# Patient Record
Sex: MALE | Race: WHITE | NOT HISPANIC OR LATINO | Employment: FULL TIME | URBAN - METROPOLITAN AREA
[De-identification: names, ages, dates, MRNs, and addresses within clinical notes are randomized per-mention and may not be internally consistent; named-entity substitution may affect disease eponyms.]

---

## 2023-11-22 PROBLEM — H92.21 EAR BLEEDING, RIGHT: Status: ACTIVE | Noted: 2023-11-22

## 2023-11-22 PROBLEM — J34.2 NASAL SEPTAL DEVIATION: Status: ACTIVE | Noted: 2023-11-22

## 2023-11-22 PROBLEM — H61.23 BILATERAL IMPACTED CERUMEN: Status: ACTIVE | Noted: 2023-11-22

## 2023-11-22 PROBLEM — H93.293 ABNORMAL AUDITORY PERCEPTION OF BOTH EARS: Status: ACTIVE | Noted: 2023-11-22

## 2023-11-22 PROBLEM — J31.0 RHINITIS MEDICAMENTOSA: Status: ACTIVE | Noted: 2023-11-22

## 2023-11-22 PROBLEM — G47.33 SLEEP APNEA, OBSTRUCTIVE: Status: ACTIVE | Noted: 2023-11-22

## 2023-11-22 PROBLEM — J34.89 NASAL DRYNESS: Status: ACTIVE | Noted: 2023-11-22

## 2023-11-22 PROBLEM — R04.0 EPISTAXIS, RECURRENT: Status: ACTIVE | Noted: 2023-11-22

## 2023-11-22 PROBLEM — T48.5X5A RHINITIS MEDICAMENTOSA: Status: ACTIVE | Noted: 2023-11-22

## 2023-11-22 PROBLEM — J34.3 NASAL TURBINATE HYPERTROPHY: Status: ACTIVE | Noted: 2023-11-22

## 2023-11-22 RX ORDER — NEBIVOLOL 10 MG/1
TABLET ORAL
COMMUNITY
Start: 2015-11-09

## 2023-11-22 RX ORDER — HYDROCHLOROTHIAZIDE 25 MG/1
TABLET ORAL
COMMUNITY
Start: 2016-12-01

## 2023-11-22 RX ORDER — ASPIRIN 81 MG/1
TABLET ORAL
COMMUNITY
Start: 2016-08-04

## 2023-11-22 RX ORDER — OMEPRAZOLE 40 MG/1
CAPSULE, DELAYED RELEASE ORAL
COMMUNITY
Start: 2015-11-09

## 2023-11-28 NOTE — PROGRESS NOTES
Subjective   Patient ID: Shahab Quiñones is a 58 y.o. male who presents for No chief complaint on file..  HPI  This 58-year-old male is being seen back in the office for 3-month recheck of his ears and head and neck.  He has a tendency for ceruminous buildup which complicates his hearing function.  Once his canals are patent he has no complaints regarding hearing.  He has on past visits chose not to have follow-up audiograms.  He did have a verrucous lesion removed from his right ear in the past.  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  There is been no new head and neck issues noted at this time.  He has had problems in the past with turbinate hypertrophy and at one point was using decongestant nasal sprays causing rhinitis medicamentosa's.   He has been encouraged to use saline nasal rinses and topical steroid sprays.  He at times does use some tobacco which can be detrimental to his upper respiratory tract and has been advised to restrain from that.  Review of Systems    Objective   Physical Exam  EXAMINATION:    GENERAL JONATHAN.EARANCE: Alert, in no acute distress, normal pitch and clarity of voice, well-developed and nourished, cooperative.    HEAD/FACE: Normocephalic, atraumatic, normal facial movements and strength, no no tenderness to palpation, no lesions noted.    SKIN: Normal turgor, no raised or ulcerative lesions, warm and dry to palpation.    EYES: Extraocular motions intact, no nystagmus noted, pupils equal and reactive to light and accommodation, no conjunctivitis.    EARS: Both ears--external ear anatomy is normal without lesions, cerumen partially obstructs the ear canals and is removed, auditory canals are patent and without skin abrasions or lesions, hearing is intact to voice, tympanic membranes are intact with no acute inflammation, light reflexes present, no effusions are noted and no mastoid tenderness found to palpation.    NOSE: No external  skin lesions are noted, nares are patent, septum is anteriorly deviated to the left and then inferiorly deviated to the right, there is mild t nasal dryness noted on the septal mucosa and no intranasal signs of purulence is seen     OROPHARYNX/ORAL CAVITY: Oropharynx is not inflamed and is without lesions, mucosa of the oral cavity is intact and without lesions, tongue is midline and mobile, no acute dental disease is noted, TMJs are mobile    LUNG-- NO wheezing or rhonchi normal respiratory effort    HEART-- No venous congestion,  rate and rhythm regular,    NECK: No lymphadenopathy is palpated, carotid pulses are intact, neck is supple with full range of motion, no thyroid abnormalities are noted, trachea is midline, no neck masses are palpated.    LYMPHATICS: No cervical adenopathy or supraclavicular adenopathy is palpated.    NEUROLOGIC/PSYCH; alert and oriented, cranial nerves are grossly intact, gait is without falling, no motor deficits are noted.    Patient ID: Shahab Quiñones is a 58 y.o. male.    Ear cerumen removal    Date/Time: 11/30/2023 8:22 AM    Performed by: Reji Lake DMD, MD  Authorized by: Reji Lake DMD, MD    Consent:     Consent obtained:  Verbal    Consent given by:  Patient    Risks, benefits, and alternatives were discussed: yes      Risks discussed:  Incomplete removal, pain, infection, dizziness and bleeding    Alternatives discussed:  Observation  Post-procedure details:     Procedure completion:  Tolerated well, no immediate complications  Comments:      CERUMEN REMOVAL    Consent:   the planned procedure is discussed including possible risks, benefits and alternative treatments reviewed. Verbal consent is obtained.    Indications:  obstructive cerumen is noted affecting hearing and causing discomfort.    Procedure: The ears are examined microscopically and obstructive cerumen is removed with a wax loop, suction, and forceps.    Findings: Cerumen and epithelial debris  obstructs both canals. Tympanic membranes are intact and noninflamed once visualized and no infections are noted.    Post procedure: The patient tolerated the procedure well without complications.    Assessment/Plan   Problem List Items Addressed This Visit             ICD-10-CM    Bilateral impacted cerumen - Primary H61.23    Nasal dryness J34.89    Nasal septal deviation J34.2    Nasal turbinate hypertrophy J34.3     The degree of cerumen buildup was not enough to compromise hearing but does seem to accrue and he feels comfortable staying on a 3-month schedule for debridement.  I did encourage him at some point to have a baseline hearing test done for further reference.  He does periodically have streaks of blood in nasal mucus likely from dryness and I did encourage him to use nasal saline washes along with saline gels especially during the winter season to counteract that problem.  If he has any chelsey nosebleeds he should contact the office and be seen.  This would also be important if he had any facial pain or headache or symptoms to suggest sinus involvement.

## 2023-11-30 ENCOUNTER — OFFICE VISIT (OUTPATIENT)
Dept: OTOLARYNGOLOGY | Facility: CLINIC | Age: 58
End: 2023-11-30
Payer: COMMERCIAL

## 2023-11-30 VITALS — WEIGHT: 240 LBS | HEIGHT: 72 IN | BODY MASS INDEX: 32.51 KG/M2

## 2023-11-30 DIAGNOSIS — J34.3 NASAL TURBINATE HYPERTROPHY: ICD-10-CM

## 2023-11-30 DIAGNOSIS — J34.89 NASAL DRYNESS: ICD-10-CM

## 2023-11-30 DIAGNOSIS — H61.23 BILATERAL IMPACTED CERUMEN: Primary | ICD-10-CM

## 2023-11-30 DIAGNOSIS — J34.2 NASAL SEPTAL DEVIATION: ICD-10-CM

## 2023-11-30 PROBLEM — E11.9 TYPE 2 DIABETES MELLITUS (MULTI): Status: ACTIVE | Noted: 2018-05-23

## 2023-11-30 PROBLEM — E78.5 HYPERLIPIDEMIA: Status: ACTIVE | Noted: 2018-05-23

## 2023-11-30 PROBLEM — E66.9 OBESITY WITH BODY MASS INDEX 30 OR GREATER: Status: ACTIVE | Noted: 2018-05-23

## 2023-11-30 PROBLEM — I65.29 CAROTID ARTERY STENOSIS: Status: ACTIVE | Noted: 2018-05-23

## 2023-11-30 PROBLEM — I25.10 ATHEROSCLEROSIS OF CORONARY ARTERY: Status: ACTIVE | Noted: 2018-05-23

## 2023-11-30 PROBLEM — F17.220 CHEWING TOBACCO NICOTINE DEPENDENCE: Status: ACTIVE | Noted: 2023-11-30

## 2023-11-30 PROBLEM — I10 ESSENTIAL HYPERTENSION: Status: ACTIVE | Noted: 2018-05-23

## 2023-11-30 PROBLEM — Z95.5 H/O HEART ARTERY STENT: Status: ACTIVE | Noted: 2023-11-30

## 2023-11-30 PROCEDURE — 99213 OFFICE O/P EST LOW 20 MIN: CPT | Performed by: OTOLARYNGOLOGY

## 2023-11-30 PROCEDURE — 69210 REMOVE IMPACTED EAR WAX UNI: CPT | Performed by: OTOLARYNGOLOGY

## 2023-11-30 RX ORDER — ROSUVASTATIN CALCIUM 20 MG/1
TABLET, COATED ORAL
COMMUNITY
Start: 2023-09-25

## 2023-11-30 RX ORDER — ATORVASTATIN CALCIUM 80 MG/1
TABLET, FILM COATED ORAL
COMMUNITY
Start: 2020-02-11 | End: 2023-11-30 | Stop reason: ALTCHOICE

## 2023-11-30 RX ORDER — INSULIN LISPRO 100 [IU]/ML
INJECTION, SOLUTION INTRAVENOUS; SUBCUTANEOUS
COMMUNITY
Start: 2023-09-25

## 2023-11-30 RX ORDER — INSULIN GLARGINE 300 U/ML
INJECTION, SOLUTION SUBCUTANEOUS
COMMUNITY
Start: 2023-03-13

## 2024-02-25 NOTE — PROGRESS NOTES
Subjective   Patient ID: Shahab Quiñones is a 58 y.o. male who presents for No chief complaint on file..  HPI  This 58-year-old male is being seen back in the office for 3-month recheck of his ears and head and neck. He has a tendency for ceruminous buildup which complicates his hearing function. Once his canals are patent he has no complaints regarding hearing. He has on past visits chose not to have follow-up audiograms. He did have a verrucous lesion removed from his right ear in the past. 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 There is been no new head and neck issues noted at this time.  He has had some difficulties in the past with nasal congestion primarily associated with nasal turbinate hypertrophy.    Review of Systems  A 12 point ROS has been reviewed and are negative for complaint except what is stated in the history of present illness and/or past medical history as noted in the EMR    Objective   Physical Exam  EXAMINATION:     GENERAL JONATHAN.EARANCE: Alert, in no acute distress, normal pitch and clarity of voice, well-developed and nourished, cooperative.     HEAD/FACE: Normocephalic, atraumatic, normal facial movements and strength, no no tenderness to palpation, no lesions noted.     SKIN: Normal turgor, no raised or ulcerative lesions, warm and dry to palpation.     EYES: Extraocular motions intact, no nystagmus noted, pupils equal and reactive to light and accommodation, no conjunctivitis.     EARS: Both ears--external ear anatomy is normal without lesions, cerumen partially obstructs the ear canals and is removed, auditory canals are patent and without skin abrasions or lesions, hearing is intact to voice, tympanic membranes are intact with no acute inflammation, light reflexes present, no effusions are noted and no mastoid tenderness found to palpation.     NOSE: No external skin lesions are noted, nares are patent, septum is anteriorly deviated  to the left and then inferiorly deviated to the right, there is mild t nasal dryness noted on the septal mucosa and no intranasal signs of purulence is seen      OROPHARYNX/ORAL CAVITY: Oropharynx is not inflamed and is without lesions, mucosa of the oral cavity is intact and without lesions, tongue is midline and mobile, no acute dental disease is noted, TMJs are mobile     LUNG-- NO wheezing or rhonchi normal respiratory effort     HEART-- No venous congestion,  rate and rhythm regular,     NECK: No lymphadenopathy is palpated, carotid pulses are intact, neck is supple with full range of motion, no thyroid abnormalities are noted, trachea is midline, no neck masses are palpated.     LYMPHATICS: No cervical adenopathy or supraclavicular adenopathy is palpated.     NEUROLOGIC/PSYCH; alert and oriented, cranial nerves are grossly intact, gait is without falling, no motor deficits are noted.    Patient ID: Shahab Quiñones is a 58 y.o. male.    Ear cerumen removal    Date/Time: 2/29/2024 8:10 AM    Performed by: Reji Lake DMD, MD  Authorized by: Reji Lake DMD, MD    Consent:     Consent obtained:  Verbal    Consent given by:  Patient    Risks discussed:  Pain and incomplete removal    Alternatives discussed:  No treatment and alternative treatment  Universal protocol:     Procedure explained and questions answered to patient or proxy's satisfaction: yes      Imaging studies available: no      Required blood products, implants, devices, and special equipment available: no      Patient identity confirmed:  Verbally with patient  Procedure details:     Location:  L ear and R ear    Procedure type: curette      Procedure type comment:  Or suction    Procedure outcomes: cerumen removed    Post-procedure details:     Inspection:  No bleeding and ear canal clear    Hearing quality:  Improved    Procedure completion:  Tolerated well, no immediate complications  Comments:      CERUMEN REMOVAL    Consent:   the  planned procedure is discussed including possible risks, benefits and alternative treatments reviewed. Verbal consent is obtained.    Indications:  obstructive cerumen is noted affecting hearing and causing discomfort.    Procedure: The ears are examined microscopically and obstructive cerumen is removed with a wax loop, suction, and forceps.    Findings: Cerumen and epithelial debris obstructs both canals. Tympanic membranes are intact and noninflamed once visualized and no infections are noted.    Post procedure: The patient tolerated the procedure well without complications.    Assessment/Plan   Problem List Items Addressed This Visit             ICD-10-CM    Abnormal auditory perception of both ears - Primary H93.293    Bilateral impacted cerumen H61.23    Nasal septal deviation J34.2    Nasal turbinate hypertrophy J34.3    Chewing tobacco nicotine dependence F17.220     I discussed the clinical finds with patient.  His exam today did show evidence for some cerumen around the outer third of the canal which was removed but no deep impaction was noted.  The tympanic membranes were intact and were negative for signs of inflammation.  There was no evidence for nasal crusting although he has had problems with dryness and surface bleeding in the past.  He should continue with the saline gels either spray or ointment form and if he does have crusting around the nasal vestibular area causing inflammation then bacitracin ointment twice a day for a week would be helpful.  We reviewed again the ways to try and counteract barotrauma during flying which has been a problem for him in the past.  The audiogram that was done in 2023 was reviewed with him making sure he was aware that he does have a moderate to moderately severe high-frequency hearing loss and protection of his years from loud sound is important to continue.   He does intermittently use chewing tobacco and he is aware of the potential ramifications of that.  I  encouraged him to avoid tobacco use overall.    Reji Lake DMD, MD 02/25/24 11:07 AM

## 2024-02-29 ENCOUNTER — OFFICE VISIT (OUTPATIENT)
Dept: OTOLARYNGOLOGY | Facility: CLINIC | Age: 59
End: 2024-02-29
Payer: COMMERCIAL

## 2024-02-29 VITALS — HEIGHT: 72 IN | WEIGHT: 240 LBS | BODY MASS INDEX: 32.51 KG/M2

## 2024-02-29 DIAGNOSIS — J34.3 NASAL TURBINATE HYPERTROPHY: ICD-10-CM

## 2024-02-29 DIAGNOSIS — H93.293 ABNORMAL AUDITORY PERCEPTION OF BOTH EARS: Primary | ICD-10-CM

## 2024-02-29 DIAGNOSIS — H61.23 BILATERAL IMPACTED CERUMEN: ICD-10-CM

## 2024-02-29 DIAGNOSIS — F17.220 CHEWING TOBACCO NICOTINE DEPENDENCE WITHOUT COMPLICATION: ICD-10-CM

## 2024-02-29 DIAGNOSIS — J34.2 NASAL SEPTAL DEVIATION: ICD-10-CM

## 2024-02-29 PROCEDURE — 1036F TOBACCO NON-USER: CPT | Performed by: OTOLARYNGOLOGY

## 2024-02-29 PROCEDURE — 69210 REMOVE IMPACTED EAR WAX UNI: CPT | Performed by: OTOLARYNGOLOGY

## 2024-02-29 PROCEDURE — 99213 OFFICE O/P EST LOW 20 MIN: CPT | Performed by: OTOLARYNGOLOGY

## 2024-02-29 RX ORDER — TIZANIDINE 4 MG/1
TABLET ORAL
COMMUNITY
Start: 2024-02-13 | End: 2024-02-29 | Stop reason: ALTCHOICE

## 2024-02-29 RX ORDER — LEVOTHYROXINE SODIUM 75 UG/1
75 TABLET ORAL DAILY
COMMUNITY
Start: 2024-01-09

## 2024-05-20 NOTE — PROGRESS NOTES
Subjective   Patient ID: Shahab Quiñones is a 58 y.o. male who presents for No chief complaint on file..  HPI  This 58-year-old male is being seen back in the office for scheduled recheck.  He has had difficulties with obstruction from ceruminous debris requiring debridement.  He has deferred follow-up audiograms but has no complaints in regards to functional hearing.  He has had in the past some difficulties with barotrauma during flights and at times has difficulties with nasal congestion prompted by turbinate hypertrophy.  He does have a history of insulin-dependent diabetes, sleep apnea, coronary artery disease, essential hypertension.  He is having no difficulties with his hearing and has had no problems with respiratory infections or eustachian tube function.  He continues to use oral tobacco.  Review of Systems  A 12 point ROS has been reviewed and are negative for complaint except what is stated in the history of present illness and/or past medical history as noted in the EMR  Active Ambulatory Problems     Diagnosis Date Noted    Abnormal auditory perception of both ears 11/22/2023    Bilateral impacted cerumen 11/22/2023    Ear bleeding, right 11/22/2023    Epistaxis, recurrent 11/22/2023    Nasal dryness 11/22/2023    Nasal septal deviation 11/22/2023    Nasal turbinate hypertrophy 11/22/2023    Rhinitis medicamentosa 11/22/2023    Sleep apnea, obstructive 11/22/2023    Atherosclerosis of coronary artery 05/23/2018    Carotid artery stenosis 05/23/2018    Chewing tobacco nicotine dependence 11/30/2023    Essential hypertension 05/23/2018    H/O heart artery stent 11/30/2023    Hyperlipidemia 05/23/2018    Obesity with body mass index 30 or greater 05/23/2018    Type 2 diabetes mellitus (Multi) 05/23/2018     Resolved Ambulatory Problems     Diagnosis Date Noted    No Resolved Ambulatory Problems     Past Medical History:   Diagnosis Date    Disease of stomach and duodenum, unspecified     Personal history  of other diseases of the circulatory system     Personal history of other drug therapy     Personal history of other endocrine, nutritional and metabolic disease     Pure hypercholesterolemia, unspecified            Current Outpatient Medications:     aspirin 81 mg EC tablet, Take by mouth., Disp: , Rfl:     HumaLOG KwikPen Insulin 100 unit/mL injection, , Disp: , Rfl:     hydroCHLOROthiazide (HYDRODiuril) 25 mg tablet, Take by mouth., Disp: , Rfl:     levothyroxine (Synthroid, Levoxyl) 75 mcg tablet, Take 1 tablet (75 mcg) by mouth once daily., Disp: , Rfl:     nebivolol (Bystolic) 10 mg tablet, Take by mouth., Disp: , Rfl:     omeprazole (PriLOSEC) 40 mg DR capsule, Take by mouth., Disp: , Rfl:     rosuvastatin (Crestor) 20 mg tablet, , Disp: , Rfl:     ticagrelor (Brilinta) 60 mg tablet, Take by mouth., Disp: , Rfl:     Toujeo Max U-300 SoloStar 300 unit/mL (3 mL) injection, , Disp: , Rfl:      Social History     Tobacco Use    Smoking status: Former     Current packs/day: 1.00     Average packs/day: 1 pack/day for 10.0 years (10.0 ttl pk-yrs)     Types: Cigarettes    Smokeless tobacco: Never   Substance Use Topics    Alcohol use: Yes        Allergies   Allergen Reactions    Ace Inhibitors Other    Angiotensin Ii,Human Other    Arb-Angiotensin Receptor Antagonist Other        There were no vitals taken for this visit.   Objective   Physical Exam  EXAMINATION:     GENERAL JONATHAN.EARANCE: Alert, in no acute distress, normal pitch and clarity of voice, well-developed and nourished, cooperative.     HEAD/FACE: Normocephalic, atraumatic, normal facial movements and strength, no no tenderness to palpation, no lesions noted.     SKIN: Normal turgor, no raised or ulcerative lesions, warm and dry to palpation.     EYES: Extraocular motions intact, no nystagmus noted, pupils equal and reactive to light and accommodation, no conjunctivitis.     EARS: Both ears--external ear anatomy is normal without lesions, cerumen partially  obstructs the ear canals and is removed, auditory canals are patent and without skin abrasions or lesions, hearing is intact to voice, tympanic membranes are intact with no acute inflammation, light reflexes present, no effusions are noted and no mastoid tenderness found to palpation.     NOSE: No external skin lesions are noted, nares are patent, septum is anteriorly deviated to the left and then inferiorly deviated to the right, there is mild t nasal dryness noted on the septal mucosa and no intranasal signs of purulence is seen      OROPHARYNX/ORAL CAVITY: Oropharynx is not inflamed and is without lesions, mucosa of the oral cavity is intact and without lesions, tongue is midline and mobile, no acute dental disease is noted, TMJs are mobile, there did not appear to be any leukoplakia or erythroplasia in the labial or buccal sulcus from his tobacco use.     LUNG-- NO wheezing or rhonchi normal respiratory effort     HEART-- No venous congestion,  rate and rhythm regular,     NECK: No lymphadenopathy is palpated, carotid pulses are intact, neck is supple with full range of motion, no thyroid abnormalities are noted, trachea is midline, no neck masses are palpated.     LYMPHATICS: No cervical adenopathy or supraclavicular adenopathy is palpated.     NEUROLOGIC/PSYCH; alert and oriented, cranial nerves are grossly intact, gait is without falling, no motor deficits are noted.    Patient ID: Shahab Quiñones is a 58 y.o. male.    Ear cerumen removal    Date/Time: 5/23/2024 12:45 PM    Performed by: Reji Lake DMD, MD  Authorized by: Reji Lake DMD, MD    Consent:     Consent obtained:  Verbal    Consent given by:  Patient    Risks discussed:  Pain and incomplete removal    Alternatives discussed:  No treatment and alternative treatment  Universal protocol:     Procedure explained and questions answered to patient or proxy's satisfaction: yes      Imaging studies available: no      Required blood products,  implants, devices, and special equipment available: no      Patient identity confirmed:  Verbally with patient  Procedure details:     Location:  L ear and R ear    Procedure type: curette      Procedure type comment:  Or suction    Procedure outcomes: cerumen removed    Post-procedure details:     Inspection:  No bleeding, ear canal clear and TM intact    Hearing quality:  Improved    Procedure completion:  Tolerated well, no immediate complications       Assessment/Plan   Problem List Items Addressed This Visit             ICD-10-CM    Bilateral impacted cerumen - Primary H61.23    Nasal dryness J34.89    Nasal septal deviation J34.2    Nasal turbinate hypertrophy J34.3    Sleep apnea, obstructive G47.33    Chewing tobacco nicotine dependence F17.220     I discussed the clinical finds with the patient.  In review of his findings there did not appear to be any acute abnormalities in the head neck.  No inflammation is noted involving the ear canal or middle ear.  He has no risk currents of the papillomatous lesion removed from his right outer ear.  No active signs of infection in the upper respiratory tract are noted.  He seems to be doing well with swallowing and with voice.  He will stay in a 3-month checkup schedule unless there are symptoms that develop in regards to discomfort or signs of infection.  His last hearing test was done in 2023 and he does have a high-frequency hearing loss which he compensates for.       Reji Lake DMD, MD 05/20/24 8:41 AM

## 2024-05-23 ENCOUNTER — OFFICE VISIT (OUTPATIENT)
Dept: OTOLARYNGOLOGY | Facility: CLINIC | Age: 59
End: 2024-05-23
Payer: COMMERCIAL

## 2024-05-23 VITALS — WEIGHT: 240 LBS | HEIGHT: 72 IN | BODY MASS INDEX: 32.51 KG/M2

## 2024-05-23 DIAGNOSIS — J34.3 NASAL TURBINATE HYPERTROPHY: ICD-10-CM

## 2024-05-23 DIAGNOSIS — J34.2 NASAL SEPTAL DEVIATION: ICD-10-CM

## 2024-05-23 DIAGNOSIS — F17.220 CHEWING TOBACCO NICOTINE DEPENDENCE WITHOUT COMPLICATION: ICD-10-CM

## 2024-05-23 DIAGNOSIS — G47.33 SLEEP APNEA, OBSTRUCTIVE: ICD-10-CM

## 2024-05-23 DIAGNOSIS — H61.23 BILATERAL IMPACTED CERUMEN: Primary | ICD-10-CM

## 2024-05-23 DIAGNOSIS — J34.89 NASAL DRYNESS: ICD-10-CM

## 2024-05-23 PROCEDURE — 99213 OFFICE O/P EST LOW 20 MIN: CPT | Performed by: OTOLARYNGOLOGY

## 2024-05-23 PROCEDURE — 1036F TOBACCO NON-USER: CPT | Performed by: OTOLARYNGOLOGY

## 2024-05-23 PROCEDURE — 69210 REMOVE IMPACTED EAR WAX UNI: CPT | Performed by: OTOLARYNGOLOGY

## 2024-05-23 RX ORDER — TIZANIDINE 4 MG/1
4 TABLET ORAL EVERY 8 HOURS PRN
COMMUNITY
Start: 2024-02-13

## 2024-05-23 RX ORDER — DIAZEPAM 5 MG/1
5 TABLET ORAL NIGHTLY PRN
COMMUNITY
Start: 2024-05-02

## 2024-05-23 RX ORDER — BEMPEDOIC ACID 180 MG/1
TABLET, FILM COATED ORAL
COMMUNITY
Start: 2024-05-16

## 2024-08-25 NOTE — PROGRESS NOTES
Subjective   Patient ID: Shahab Quiñones is a 58 y.o. male who presents for No chief complaint on file..  HPI  This 58-year-old male is being seen back in the office for scheduled recheck.  He has had difficulties with obstruction from ceruminous debris requiring debridement.  He has deferred follow-up audiograms but has no complaints in regards to functional hearing.  He has had in the past some difficulties with barotrauma during flights and at times has difficulties with nasal congestion prompted by turbinate hypertrophy.  He does have a history of insulin-dependent diabetes, sleep apnea, coronary artery disease, essential hypertension.   Review of Systems    Objective   Physical Exam  GENERAL JONATHAN.EARANCE: Alert, in no acute distress, normal pitch and clarity of voice, well-developed and nourished, cooperative.     HEAD/FACE: Normocephalic, atraumatic, normal facial movements and strength, no no tenderness to palpation, no lesions noted.     SKIN: Normal turgor, no raised or ulcerative lesions, warm and dry to palpation.     EYES: Extraocular motions intact, no nystagmus noted, pupils equal and reactive to light and accommodation, no conjunctivitis.     EARS: Both ears--external ear anatomy is normal without lesions, cerumen partially obstructs the ear canals and is removed, auditory canals are patent and without skin abrasions or lesions, hearing is intact to voice, tympanic membranes are intact with no acute inflammation, light reflexes present, no effusions are noted and no mastoid tenderness found to palpation.     NOSE: No external skin lesions are noted, nares are patent, septum is anteriorly deviated to the left and then inferiorly deviated to the right, there is mild t nasal dryness noted on the septal mucosa and no intranasal signs of purulence is seen      OROPHARYNX/ORAL CAVITY: Oropharynx is not inflamed and is without lesions, mucosa of the oral cavity is intact and without lesions, tongue is midline  and mobile, no acute dental disease is noted, TMJs are mobile, there did not appear to be any leukoplakia or erythroplasia in the labial or buccal sulcus from his tobacco use.     LUNG-- NO wheezing or rhonchi normal respiratory effort     HEART-- No venous congestion,  rate and rhythm regular,     NECK: No lymphadenopathy is palpated, carotid pulses are intact, neck is supple with full range of motion, no thyroid abnormalities are noted, trachea is midline, no neck masses are palpated.     LYMPHATICS: No cervical adenopathy or supraclavicular adenopathy is palpated.     NEUROLOGIC/PSYCH; alert and oriented, cranial nerves are grossly intact, gait is without falling, no motor deficits are noted.    Patient ID: Shahab Quiñones is a 58 y.o. male.    Ear cerumen removal    Date/Time: 8/29/2024 8:03 AM    Performed by: Reji Lake DMD, MD  Authorized by: Reji Lake DMD, MD    Consent:     Consent obtained:  Verbal    Consent given by:  Patient    Risks discussed:  Pain and incomplete removal    Alternatives discussed:  No treatment and alternative treatment  Universal protocol:     Procedure explained and questions answered to patient or proxy's satisfaction: yes      Imaging studies available: no      Required blood products, implants, devices, and special equipment available: no      Patient identity confirmed:  Verbally with patient  Procedure details:     Location:  L ear and R ear    Procedure type: curette      Procedure type comment:  Or suction    Procedure outcomes: cerumen removed    Post-procedure details:     Inspection:  No bleeding and ear canal clear    Hearing quality:  Improved    Procedure completion:  Tolerated well, no immediate complications    Assessment/Plan   Problem List Items Addressed This Visit             ICD-10-CM    Bilateral impacted cerumen - Primary H61.23    Nasal septal deviation J34.2    Nasal turbinate hypertrophy J34.3    Chewing tobacco nicotine dependence F17.220      Other Visit Diagnoses         Codes    History of diabetes mellitus     Z86.39          I discussed the clinical findings with the patient.  He had no lingering symptoms from her recent COVID infection that he had.  He seems to be back to his baseline from a nasal standpoint which was a problem for him when ill.  There is mental difficulties with vertigo or tinnitus.  He still is comfortable with his hearing pattern.  He has had no difficulties with ear discomfort.  His right ear was much more obstructed than his left.  Discussed in a 3-month checkup schedule as per his request.  He will be has any difficulties in the interim.       Reji Lake DMD, MD 08/25/24 4:53 PM

## 2024-08-29 ENCOUNTER — APPOINTMENT (OUTPATIENT)
Dept: OTOLARYNGOLOGY | Facility: CLINIC | Age: 59
End: 2024-08-29
Payer: COMMERCIAL

## 2024-08-29 VITALS — WEIGHT: 245 LBS | BODY MASS INDEX: 33.18 KG/M2 | HEIGHT: 72 IN

## 2024-08-29 DIAGNOSIS — F17.220 CHEWING TOBACCO NICOTINE DEPENDENCE WITHOUT COMPLICATION: ICD-10-CM

## 2024-08-29 DIAGNOSIS — H61.23 BILATERAL IMPACTED CERUMEN: Primary | ICD-10-CM

## 2024-08-29 DIAGNOSIS — Z86.39 HISTORY OF DIABETES MELLITUS: ICD-10-CM

## 2024-08-29 DIAGNOSIS — J34.3 NASAL TURBINATE HYPERTROPHY: ICD-10-CM

## 2024-08-29 DIAGNOSIS — J34.2 NASAL SEPTAL DEVIATION: ICD-10-CM

## 2024-08-29 PROCEDURE — 1036F TOBACCO NON-USER: CPT | Performed by: OTOLARYNGOLOGY

## 2024-08-29 PROCEDURE — 69210 REMOVE IMPACTED EAR WAX UNI: CPT | Performed by: OTOLARYNGOLOGY

## 2024-08-29 PROCEDURE — 3008F BODY MASS INDEX DOCD: CPT | Performed by: OTOLARYNGOLOGY

## 2024-08-29 PROCEDURE — 99213 OFFICE O/P EST LOW 20 MIN: CPT | Performed by: OTOLARYNGOLOGY

## 2024-12-01 NOTE — PROGRESS NOTES
Subjective   Patient ID: Shahab Quiñones is a 59 y.o. male who presents for No chief complaint on file..  HPI  This 59-year-old male is being seen back in the office for scheduled recheck. He has had difficulties with obstruction from ceruminous debris requiring debridement. He has deferred follow-up audiograms but has no complaints in regards to functional hearing. He has had in the past some difficulties with barotrauma during flights and at times has difficulties with nasal congestion prompted by turbinate hypertrophy. He does have a history of insulin-dependent diabetes, sleep apnea, coronary artery disease, essential hypertension.  He is still a user of chewing tobacco knowing the risk factors entailed.  He is lately been having significant difficulties with congestion that seems to be intermittent in nature and he has on occasion used Afrin as a decongestant.  He did have difficulties with that in the past.  Review of Systems   A 12 point ROS  has been reviewed and are negative for complaint except for what is stated in the history of present illness and /or for past medical history as noted in the EMR.    Past Medical History:   Diagnosis Date    Disease of stomach and duodenum, unspecified     Stomach problems    Personal history of other diseases of the circulatory system     H/O: HTN (hypertension)    Personal history of other drug therapy     COVID-19 vaccine series completed    Personal history of other endocrine, nutritional and metabolic disease     History of diabetes mellitus    Pure hypercholesterolemia, unspecified     High cholesterol          Current Outpatient Medications:     aspirin 81 mg EC tablet, Take by mouth., Disp: , Rfl:     diazePAM (Valium) 5 mg tablet, Take 1 tablet (5 mg) by mouth as needed at bedtime., Disp: , Rfl:     HumaLOG KwikPen Insulin 100 unit/mL injection, , Disp: , Rfl:     hydroCHLOROthiazide (HYDRODiuril) 25 mg tablet, Take by mouth., Disp: , Rfl:     levothyroxine  (Synthroid, Levoxyl) 75 mcg tablet, Take 1 tablet (75 mcg) by mouth once daily., Disp: , Rfl:     nebivolol (Bystolic) 10 mg tablet, Take by mouth., Disp: , Rfl:     Nexletol 180 mg tablet, , Disp: , Rfl:     omeprazole (PriLOSEC) 40 mg DR capsule, Take by mouth., Disp: , Rfl:     rosuvastatin (Crestor) 20 mg tablet, , Disp: , Rfl:     ticagrelor (Brilinta) 60 mg tablet, Take by mouth., Disp: , Rfl:     tiZANidine (Zanaflex) 4 mg tablet, Take 1 tablet (4 mg) by mouth every 8 hours if needed., Disp: , Rfl:     Toujeo Max U-300 SoloStar 300 unit/mL (3 mL) injection, , Disp: , Rfl:      Social History     Tobacco Use    Smoking status: Former     Current packs/day: 1.00     Average packs/day: 1 pack/day for 10.0 years (10.0 ttl pk-yrs)     Types: Cigarettes    Smokeless tobacco: Never   Substance Use Topics    Alcohol use: Yes       Allergies   Allergen Reactions    Ace Inhibitors Other    Angiotensin Ii,Human Other    Arb-Angiotensin Receptor Antagonist Other       There were no vitals taken for this visit.    Objective   Physical Exam  GENERAL JONATHAN.EARANCE: Alert, in no acute distress, normal pitch and clarity of voice, well-developed and nourished, cooperative.     HEAD/FACE: Normocephalic, atraumatic, normal facial movements and strength, no no tenderness to palpation, no lesions noted.     SKIN: Normal turgor, no raised or ulcerative lesions, warm and dry to palpation.     EYES: Extraocular motions intact, no nystagmus noted, pupils equal and reactive to light and accommodation, no conjunctivitis.     EARS: Both ears--external ear anatomy is normal without lesions, cerumen  obstructs the ear canals and is removed, auditory canals are patent and without skin abrasions or lesions, hearing is intact to voice, tympanic membranes are intact with no acute inflammation, light reflexes present, no effusions are noted and no mastoid tenderness found to palpation.     NOSE: No external skin lesions are noted, nares are  patent, septum is anteriorly deviated to the left and then inferiorly deviated to the right, there is mild t nasal dryness noted on the septal mucosa and no intranasal signs of purulence is seen      OROPHARYNX/ORAL CAVITY: Oropharynx is not inflamed and is without lesions, mucosa of the oral cavity is intact and without lesions, tongue is midline and mobile, no acute dental disease is noted, TMJs are mobile, there did not appear to be any leukoplakia or erythroplasia in the labial or buccal sulcus from his tobacco use.     LUNG-- NO wheezing or rhonchi normal respiratory effort     HEART-- No venous congestion,  rate and rhythm regular,     NECK: No lymphadenopathy is palpated, carotid pulses are intact, neck is supple with full range of motion, no thyroid abnormalities are noted, trachea is midline, no neck masses are palpated.     LYMPHATICS: No cervical adenopathy or supraclavicular adenopathy is palpated.     NEUROLOGIC/PSYCH; alert and oriented, cranial nerves are grossly intact, gait is without falling, no motor deficits are noted.    Patient ID: Shahab Quiñones is a 59 y.o. male.    Ear cerumen removal    Date/Time: 12/4/2024 8:21 AM    Performed by: Reji Lake DMD, MD  Authorized by: Reji Lake DMD, MD    Consent:     Consent obtained:  Verbal    Consent given by:  Patient    Risks discussed:  Pain and incomplete removal    Alternatives discussed:  No treatment and alternative treatment  Universal protocol:     Procedure explained and questions answered to patient or proxy's satisfaction: yes      Imaging studies available: no      Required blood products, implants, devices, and special equipment available: no      Patient identity confirmed:  Verbally with patient  Procedure details:     Location:  L ear and R ear    Procedure type: curette      Procedure type comment:  Or suction    Procedure outcomes: cerumen removed    Post-procedure details:     Inspection:  No bleeding and ear canal  clear    Hearing quality:  Improved    Procedure completion:  Tolerated well, no immediate complications    Assessment/Plan   Problem List Items Addressed This Visit             ICD-10-CM    Bilateral impacted cerumen - Primary H61.23    Nasal septal deviation J34.2    Relevant Medications    azithromycin (Zithromax Z-Bam) 250 mg tablet    Nasal turbinate hypertrophy J34.3    Relevant Medications    azithromycin (Zithromax Z-Bam) 250 mg tablet    Chewing tobacco nicotine dependence F17.220     Other Visit Diagnoses         Codes    History of diabetes mellitus     Z86.39    Purulent rhinitis     J31.0    Relevant Medications    azithromycin (Zithromax Z-Bam) 250 mg tablet          I discussed the findings with the patient. Do to the history of cerumen impactions, avoidance of Q tip use and water contamination is advised. Periodic check ups in the office or with the PCP are advised and if recurrent obstructions are noted a scheduled cleaning schedule will be maintained. Ear pain, otorhea, changes in hearing should be reported to the office. For some the use of debrox or baby oil can be helpful as long as lucien TM is intact and not perforated.    He has been having difficulties with nasal congestion.  I suggest that he increase nasal saline rinsing to do at least daily using purified water to mix the saline.  He should stay on his topical steroid eel sprays.  Z-Bam is going to be prescribed as well forts anti-inflammatory effect.  He is unable to use steroid medication due to his diabetes.  If he has increasing symptoms symptoms that are continual then he needs to contact the office.       Reji Lake DMD, MD 12/01/24 10:15 AM

## 2024-12-04 ENCOUNTER — APPOINTMENT (OUTPATIENT)
Dept: OTOLARYNGOLOGY | Facility: CLINIC | Age: 59
End: 2024-12-04
Payer: COMMERCIAL

## 2024-12-04 VITALS — BODY MASS INDEX: 32.51 KG/M2 | HEIGHT: 72 IN | WEIGHT: 240 LBS

## 2024-12-04 DIAGNOSIS — H61.23 BILATERAL IMPACTED CERUMEN: Primary | ICD-10-CM

## 2024-12-04 DIAGNOSIS — Z86.39 HISTORY OF DIABETES MELLITUS: ICD-10-CM

## 2024-12-04 DIAGNOSIS — J31.0 PURULENT RHINITIS: ICD-10-CM

## 2024-12-04 DIAGNOSIS — J34.3 NASAL TURBINATE HYPERTROPHY: ICD-10-CM

## 2024-12-04 DIAGNOSIS — J34.2 NASAL SEPTAL DEVIATION: ICD-10-CM

## 2024-12-04 DIAGNOSIS — F17.220 CHEWING TOBACCO NICOTINE DEPENDENCE WITHOUT COMPLICATION: ICD-10-CM

## 2024-12-04 PROCEDURE — 3008F BODY MASS INDEX DOCD: CPT | Performed by: OTOLARYNGOLOGY

## 2024-12-04 PROCEDURE — 1036F TOBACCO NON-USER: CPT | Performed by: OTOLARYNGOLOGY

## 2024-12-04 PROCEDURE — 69210 REMOVE IMPACTED EAR WAX UNI: CPT | Performed by: OTOLARYNGOLOGY

## 2024-12-04 PROCEDURE — 99214 OFFICE O/P EST MOD 30 MIN: CPT | Performed by: OTOLARYNGOLOGY

## 2024-12-04 RX ORDER — AZITHROMYCIN 250 MG/1
250 TABLET, FILM COATED ORAL DAILY
Qty: 6 TABLET | Refills: 0 | Status: SHIPPED | OUTPATIENT
Start: 2024-12-04 | End: 2024-12-10

## 2025-03-02 NOTE — PROGRESS NOTES
Subjective   Patient ID: Shahab Quiñones is a 59 y.o. male who presents for No chief complaint on file..  HPI  This 59-year-old male is being seen back in the office for scheduled recheck. He has had difficulties with obstruction from ceruminous debris requiring debridement. He has deferred follow-up audiograms but has no complaints in regards to functional hearing. He has had in the past some difficulties with barotrauma during flights and at times has difficulties with nasal congestion prompted by turbinate hypertrophy. He does have a history of insulin-dependent diabetes, sleep apnea, coronary artery disease, essential hypertension.  He is still a user of chewing tobacco knowing the risk factors entailed.  He is lately been having significant difficulties with congestion that seems to be intermittent in nature and he has on occasion used Afrin as a decongestant.  He did have difficulties with that in the past.  No new history to report  Review of Systems   A 12 point ROS  has been reviewed and are negative for complaint except for what is stated in the history of present illness and /or for past medical history as noted in the EMR.    Past Medical History:   Diagnosis Date    Disease of stomach and duodenum, unspecified     Stomach problems    Personal history of other diseases of the circulatory system     H/O: HTN (hypertension)    Personal history of other drug therapy     COVID-19 vaccine series completed    Personal history of other endocrine, nutritional and metabolic disease     History of diabetes mellitus    Pure hypercholesterolemia, unspecified     High cholesterol          Current Outpatient Medications:     aspirin 81 mg EC tablet, Take by mouth., Disp: , Rfl:     diazePAM (Valium) 5 mg tablet, Take 1 tablet (5 mg) by mouth as needed at bedtime., Disp: , Rfl:     HumaLOG KwikPen Insulin 100 unit/mL injection, , Disp: , Rfl:     hydroCHLOROthiazide (HYDRODiuril) 25 mg tablet, Take by mouth., Disp: ,  Rfl:     levothyroxine (Synthroid, Levoxyl) 75 mcg tablet, Take 1 tablet (75 mcg) by mouth once daily., Disp: , Rfl:     nebivolol (Bystolic) 10 mg tablet, Take by mouth., Disp: , Rfl:     Nexletol 180 mg tablet, , Disp: , Rfl:     omeprazole (PriLOSEC) 40 mg DR capsule, Take by mouth., Disp: , Rfl:     rosuvastatin (Crestor) 20 mg tablet, , Disp: , Rfl:     ticagrelor (Brilinta) 60 mg tablet, Take by mouth., Disp: , Rfl:     tiZANidine (Zanaflex) 4 mg tablet, Take 1 tablet (4 mg) by mouth every 8 hours if needed., Disp: , Rfl:     Toujeo Max U-300 SoloStar 300 unit/mL (3 mL) injection, , Disp: , Rfl:      Social History     Tobacco Use    Smoking status: Former     Current packs/day: 1.00     Average packs/day: 1 pack/day for 10.0 years (10.0 ttl pk-yrs)     Types: Cigarettes    Smokeless tobacco: Never   Substance Use Topics    Alcohol use: Yes       Allergies   Allergen Reactions    Ace Inhibitors Other    Angiotensin Ii,Human Other    Arb-Angiotensin Receptor Antagonist Other     /There were no vitals taken for this visit.    Objective   Physical Exam  GENERAL JONATHAN.EARANCE: Alert, in no acute distress, normal pitch and clarity of voice, well-developed and nourished, cooperative.     HEAD/FACE: Normocephalic, atraumatic, normal facial movements and strength, no no tenderness to palpation, no lesions noted.     SKIN: Normal turgor, no raised or ulcerative lesions, warm and dry to palpation.     EYES: Extraocular motions intact, no nystagmus noted, pupils equal and reactive to light and accommodation, no conjunctivitis.     EARS: Both ears--external ear anatomy is normal without lesions, cerumen  obstructs the ear canals and is removed, auditory canals are patent and without skin abrasions or lesions, hearing is intact to voice, tympanic membranes are intact with no acute inflammation, light reflexes present, no effusions are noted and no mastoid tenderness found to palpation.     NOSE: No external skin lesions are  noted, nares are patent, septum is anteriorly deviated to the left and then inferiorly deviated to the right, some nasal dryness is noted on the left side, there is mild t nasal dryness noted on the septal mucosa and no intranasal signs of purulence is seen      OROPHARYNX/ORAL CAVITY: Oropharynx is not inflamed and is without lesions, mucosa of the oral cavity is intact and without lesions, tongue is midline and mobile, no acute dental disease is noted, TMJs are mobile, there did not appear to be any leukoplakia or erythroplasia in the labial or buccal sulcus from his tobacco use.     LUNG-- NO wheezing or rhonchi normal respiratory effort     HEART-- No venous congestion,  rate and rhythm regular,     NECK: No lymphadenopathy is palpated, carotid pulses are intact, neck is supple with full range of motion, no thyroid abnormalities are noted, trachea is midline, no neck masses are palpated.     LYMPHATICS: No cervical adenopathy or supraclavicular adenopathy is palpated.     NEUROLOGIC/PSYCH; alert and oriented, cranial nerves are grossly intact, gait is without falling, no motor deficits are noted.    Patient ID: Shahab Quiñones is a 59 y.o. male.    Ear cerumen removal    Date/Time: 3/6/2025 8:22 AM    Performed by: Reji Lake DMD, MD  Authorized by: Reji Lake DMD, MD    Consent:     Consent obtained:  Verbal    Consent given by:  Patient    Risks discussed:  Pain and incomplete removal    Alternatives discussed:  No treatment and alternative treatment  Universal protocol:     Procedure explained and questions answered to patient or proxy's satisfaction: yes      Imaging studies available: no      Required blood products, implants, devices, and special equipment available: no      Patient identity confirmed:  Verbally with patient  Procedure details:     Location:  L ear and R ear    Procedure type: curette      Procedure type comment:  Or suction    Procedure outcomes: cerumen removed     Post-procedure details:     Inspection:  No bleeding and ear canal clear    Hearing quality:  Improved    Procedure completion:  Tolerated well, no immediate complications  Comments:      Microscopic evaluation of both ears was done.  There was ceruminous obstruction on the right more than left removed with a wax loop.  Both tympanic membranes were intact with no middle ear fluid.    Assessment/Plan   Problem List Items Addressed This Visit             ICD-10-CM    Bilateral impacted cerumen - Primary H61.23    Nasal septal deviation J34.2    Nasal turbinate hypertrophy J34.3    Sleep apnea, obstructive G47.33    Chewing tobacco nicotine dependence F17.220     Other Visit Diagnoses         Codes    History of diabetes mellitus     Z86.39             I discussed the clinical finds with the patient.  His exam has not changed.  He continues to have periodic congestion nasally influenced by the septal deviation and turbinate hypertrophy.  Encouraged him to use more consistently the topical steroid sprays such as Flonase along with nasal washes or at least saline misting using lubricants to counteract dryness.  He should try to avoid any overuse of spray decongestants since it all cause some rebound congestion.  I cautioned him again about the use of oral tobacco and made him aware that if he has any sores or nonhealing areas he needs to have that checked immediately.  He is going stay in a 3-month checkup schedule likely later in the year a recheck of hearing will be done.    Reji Lake DMD, MD 03/02/25 5:14 PM

## 2025-03-06 ENCOUNTER — APPOINTMENT (OUTPATIENT)
Dept: OTOLARYNGOLOGY | Facility: CLINIC | Age: 60
End: 2025-03-06
Payer: COMMERCIAL

## 2025-03-06 DIAGNOSIS — J34.2 NASAL SEPTAL DEVIATION: ICD-10-CM

## 2025-03-06 DIAGNOSIS — Z86.39 HISTORY OF DIABETES MELLITUS: ICD-10-CM

## 2025-03-06 DIAGNOSIS — G47.33 SLEEP APNEA, OBSTRUCTIVE: ICD-10-CM

## 2025-03-06 DIAGNOSIS — F17.220 CHEWING TOBACCO NICOTINE DEPENDENCE WITHOUT COMPLICATION: ICD-10-CM

## 2025-03-06 DIAGNOSIS — H61.23 BILATERAL IMPACTED CERUMEN: Primary | ICD-10-CM

## 2025-03-06 DIAGNOSIS — J34.3 NASAL TURBINATE HYPERTROPHY: ICD-10-CM

## 2025-03-06 PROCEDURE — 1036F TOBACCO NON-USER: CPT | Performed by: OTOLARYNGOLOGY

## 2025-03-06 PROCEDURE — 99213 OFFICE O/P EST LOW 20 MIN: CPT | Performed by: OTOLARYNGOLOGY

## 2025-03-06 PROCEDURE — 69210 REMOVE IMPACTED EAR WAX UNI: CPT | Performed by: OTOLARYNGOLOGY

## 2025-06-06 NOTE — PROGRESS NOTES
PATIENT ID  Shahab Quiñones IS A 59 y.o. male WHO PRESENTS FOR      HPI   This 59-year-old male is being seen back in the office for 3-month recheck of his ears due to a tendency for cerumen buildup.  He has deferred audiograms due to lack of subjective complaints.  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  He does have a history of insulin-dependent diabetes mellitus, sleep apnea, coronary artery disease, and hypertension.  He has been a periodic user of chewing tobacco despite our discussions regarding risk factors.  ROS    A 12 point ROS  has been reviewed and are negative for complaint except for what is stated in the history of present illness and /or for past medical history as noted in the EMR.     Medical History[1]    Current Medications[2]    Social History[3]    RX Allergies[4]     vitals were not taken for this visit.     PHYSICAL EXAM  GENERAL JONATHAN.EARANCE: Alert, in no acute distress, normal pitch and clarity of voice, well-developed and nourished, cooperative.     HEAD/FACE: Normocephalic, atraumatic, normal facial movements and strength, no no tenderness to palpation, no lesions noted.     SKIN: Normal turgor, no raised or ulcerative lesions, warm and dry to palpation.     EYES: Extraocular motions intact, no nystagmus noted, pupils equal and reactive to light and accommodation, no conjunctivitis.     EARS: Both ears--external ear anatomy is normal without lesions, cerumen  obstructs the ear canals and is removed, auditory canals are patent and without skin abrasions or lesions, hearing is intact to voice, tympanic membranes are intact with no acute inflammation, light reflexes present, no effusions are noted and no mastoid tenderness found to palpation.     NOSE: No external skin lesions are noted, nares are patent, septum is anteriorly deviated to the left and then  inferiorly deviated to the right, some nasal dryness is noted on the left side, there is mild t nasal dryness noted on the septal mucosa and no intranasal signs of purulence is seen      OROPHARYNX/ORAL CAVITY: Oropharynx is not inflamed and is without lesions, mucosa of the oral cavity is intact and without lesions, tongue is midline and mobile, no acute dental disease is noted, TMJs are mobile, there did not appear to be any leukoplakia or erythroplasia in the labial or buccal sulcus from his tobacco use.     LUNG-- NO wheezing or rhonchi normal respiratory effort     HEART-- No venous congestion,  rate and rhythm regular,     NECK: No lymphadenopathy is palpated, carotid pulses are intact, neck is supple with full range of motion, no thyroid abnormalities are noted, trachea is midline, no neck masses are palpated.     LYMPHATICS: No cervical adenopathy or supraclavicular adenopathy is palpated.     NEUROLOGIC/PSYCH; alert and oriented, cranial nerves are grossly intact, gait is without falling, no motor deficits are noted.    Patient ID: Shahab Quiñones is a 59 y.o. male.    Ear cerumen removal    Date/Time: 6/10/2025 8:50 AM    Performed by: Reji Lake DMD, MD  Authorized by: Reji Lake DMD, MD    Consent:     Consent obtained:  Verbal    Consent given by:  Patient    Risks discussed:  Pain and incomplete removal    Alternatives discussed:  No treatment and alternative treatment  Universal protocol:     Procedure explained and questions answered to patient or proxy's satisfaction: yes      Imaging studies available: no      Required blood products, implants, devices, and special equipment available: no      Patient identity confirmed:  Verbally with patient  Procedure details:     Location:  L ear and R ear    Procedure type: curette      Procedure type comment:  Or suction    Procedure outcomes: cerumen removed    Post-procedure details:     Inspection:  No bleeding and ear canal clear    Hearing  quality:  Improved    Procedure completion:  Tolerated well, no immediate complications  Comments:      Microscopic evaluation of both ears was done.  There was partial cerumen is buildup bilaterally removed with a wax loop.  Tympanic membranes were within normal limits as was the skin of the ear canal.  There is no periauricular swelling or inflammation.         ASSESSMENT/PLAN  Problem List Items Addressed This Visit           ICD-10-CM    Bilateral impacted cerumen - Primary H61.23    Nasal septal deviation J34.2    Nasal turbinate hypertrophy J34.3    Sleep apnea, obstructive G47.33    Chewing tobacco nicotine dependence F17.220     Other Visit Diagnoses         Codes      History of diabetes mellitus     Z86.39        I discussed the clinical finds the patient.  From an ear standpoint he does have periods of obstruction from ceruminous debris and has chosen to be seen every 3 months to keep this from occurring.  He knows to avoid Q-tips keep excessive water out of his ears.  I have cautioned him about using chewing tobacco even though there were no lesions identified.  If he has any soreness bleeding he should avoid all tobacco products and contact the office.  Nasal obstruction correlates with turbinate hypertrophy primarily.  The use of nasal saline flushes and misting of his nose and periodic use of a topical steroid spray has been helpful in the past and should continue.  He does have a hearing loss in the mid to high frequencies last checked in 2023.  He feels comfortable monitoring this with a recheck in 2026.  If he has any sudden change in hearing he needs to contact the office he knows to try to protect his ears from loud noise, stay well-hydrated and avoid excessive salt in the diet.         [1]   Past Medical History:  Diagnosis Date    Disease of stomach and duodenum, unspecified     Stomach problems    Personal history of other diseases of the circulatory system     H/O: HTN (hypertension)     Personal history of other drug therapy     COVID-19 vaccine series completed    Personal history of other endocrine, nutritional and metabolic disease     History of diabetes mellitus    Pure hypercholesterolemia, unspecified     High cholesterol   [2]   Current Outpatient Medications:     aspirin 81 mg EC tablet, Take by mouth., Disp: , Rfl:     diazePAM (Valium) 5 mg tablet, Take 1 tablet (5 mg) by mouth as needed at bedtime., Disp: , Rfl:     HumaLOG KwikPen Insulin 100 unit/mL injection, , Disp: , Rfl:     hydroCHLOROthiazide (HYDRODiuril) 25 mg tablet, Take by mouth., Disp: , Rfl:     levothyroxine (Synthroid, Levoxyl) 75 mcg tablet, Take 1 tablet (75 mcg) by mouth once daily., Disp: , Rfl:     nebivolol (Bystolic) 10 mg tablet, Take by mouth., Disp: , Rfl:     Nexletol 180 mg tablet, , Disp: , Rfl:     omeprazole (PriLOSEC) 40 mg DR capsule, Take by mouth., Disp: , Rfl:     rosuvastatin (Crestor) 20 mg tablet, , Disp: , Rfl:     ticagrelor (Brilinta) 60 mg tablet, Take by mouth., Disp: , Rfl:     tiZANidine (Zanaflex) 4 mg tablet, Take 1 tablet (4 mg) by mouth every 8 hours if needed., Disp: , Rfl:     Toujeo Max U-300 SoloStar 300 unit/mL (3 mL) injection, , Disp: , Rfl:   [3]   Social History  Tobacco Use    Smoking status: Former     Current packs/day: 1.00     Average packs/day: 1 pack/day for 10.0 years (10.0 ttl pk-yrs)     Types: Cigarettes    Smokeless tobacco: Never   Substance Use Topics    Alcohol use: Yes    Drug use: Never   [4]   Allergies  Allergen Reactions    Ace Inhibitors Other    Angiotensin Ii,Human Other    Arb-Angiotensin Receptor Antagonist Other

## 2025-06-10 ENCOUNTER — APPOINTMENT (OUTPATIENT)
Dept: OTOLARYNGOLOGY | Facility: CLINIC | Age: 60
End: 2025-06-10
Payer: COMMERCIAL

## 2025-06-10 DIAGNOSIS — H61.23 BILATERAL IMPACTED CERUMEN: Primary | ICD-10-CM

## 2025-06-10 DIAGNOSIS — F17.220 CHEWING TOBACCO NICOTINE DEPENDENCE WITHOUT COMPLICATION: ICD-10-CM

## 2025-06-10 DIAGNOSIS — G47.33 SLEEP APNEA, OBSTRUCTIVE: ICD-10-CM

## 2025-06-10 DIAGNOSIS — Z86.39 HISTORY OF DIABETES MELLITUS: ICD-10-CM

## 2025-06-10 DIAGNOSIS — J34.2 NASAL SEPTAL DEVIATION: ICD-10-CM

## 2025-06-10 DIAGNOSIS — J34.3 NASAL TURBINATE HYPERTROPHY: ICD-10-CM

## 2025-06-10 PROCEDURE — 1036F TOBACCO NON-USER: CPT | Performed by: OTOLARYNGOLOGY

## 2025-06-10 PROCEDURE — 69210 REMOVE IMPACTED EAR WAX UNI: CPT | Performed by: OTOLARYNGOLOGY

## 2025-06-10 PROCEDURE — 99213 OFFICE O/P EST LOW 20 MIN: CPT | Performed by: OTOLARYNGOLOGY

## 2025-09-10 ENCOUNTER — APPOINTMENT (OUTPATIENT)
Dept: OTOLARYNGOLOGY | Facility: CLINIC | Age: 60
End: 2025-09-10
Payer: COMMERCIAL